# Patient Record
Sex: FEMALE | Race: WHITE | NOT HISPANIC OR LATINO
[De-identification: names, ages, dates, MRNs, and addresses within clinical notes are randomized per-mention and may not be internally consistent; named-entity substitution may affect disease eponyms.]

---

## 2018-08-22 PROBLEM — Z00.00 ENCOUNTER FOR PREVENTIVE HEALTH EXAMINATION: Status: ACTIVE | Noted: 2018-08-22

## 2018-09-04 ENCOUNTER — APPOINTMENT (OUTPATIENT)
Dept: OBGYN | Facility: CLINIC | Age: 48
End: 2018-09-04
Payer: COMMERCIAL

## 2018-09-04 PROCEDURE — 99396 PREV VISIT EST AGE 40-64: CPT

## 2018-09-25 ENCOUNTER — APPOINTMENT (OUTPATIENT)
Dept: OBGYN | Facility: CLINIC | Age: 48
End: 2018-09-25
Payer: COMMERCIAL

## 2018-09-25 PROCEDURE — 76830 TRANSVAGINAL US NON-OB: CPT

## 2023-01-03 ENCOUNTER — APPOINTMENT (OUTPATIENT)
Dept: OBGYN | Facility: CLINIC | Age: 53
End: 2023-01-03
Payer: COMMERCIAL

## 2023-01-03 ENCOUNTER — NON-APPOINTMENT (OUTPATIENT)
Age: 53
End: 2023-01-03

## 2023-01-03 VITALS
DIASTOLIC BLOOD PRESSURE: 80 MMHG | SYSTOLIC BLOOD PRESSURE: 120 MMHG | BODY MASS INDEX: 33.66 KG/M2 | WEIGHT: 190 LBS | HEIGHT: 63 IN

## 2023-01-03 DIAGNOSIS — Z01.419 ENCOUNTER FOR GYNECOLOGICAL EXAMINATION (GENERAL) (ROUTINE) W/OUT ABNORMAL FINDINGS: ICD-10-CM

## 2023-01-03 PROCEDURE — 99396 PREV VISIT EST AGE 40-64: CPT

## 2023-01-03 NOTE — HISTORY OF PRESENT ILLNESS
[FreeTextEntry1] : ---51 Y/O P2  HERE FOR CHECK UP.PT STATES HER LMP WAS 2 YRS AGO AND THEN HAD VAGINAL BLEEDING RECENTLY.\par PMHX; x   *** hearing aids\par SOCIAL;-ETOH   -CIG[employer]        x\par STD;              DISCUSSED CONDOMSx\par FAMILY HX OF BREAST CANCER,OVARIAN,UTERINE CA: pat aunt dec from ov ca age 65 - dx for 3 yrs\par REVIEW OF SYMPTOMS DONE\par ALLERGIES; Patient has answered NKDA\par Medication reconciliation was completed by reviewing, with the patient's\par involvement, the patient's current outpatient medications and those \par ordered for the patient today. x\par \par \par PE; BREASTS -MASSES DC NODES; SELF BREAST EXAM REVIEWED\par ABD SOFT NT ND\par NL GENIT \par VAGINA -DC\par CX -CMT\par UTERUS NL SIZE NT\par ADNEXA NT -MASSES\par \par A;P; CHECK UP , PMB\par -PAP\par -SONO\par -HYSTEROSCOPY\par -[facility] 2/2023 APPT\par -F-U AFTER ABOVE.

## 2023-01-10 ENCOUNTER — APPOINTMENT (OUTPATIENT)
Dept: OBGYN | Facility: CLINIC | Age: 53
End: 2023-01-10
Payer: COMMERCIAL

## 2023-01-10 PROCEDURE — 76830 TRANSVAGINAL US NON-OB: CPT

## 2023-01-17 ENCOUNTER — APPOINTMENT (OUTPATIENT)
Dept: OBGYN | Facility: CLINIC | Age: 53
End: 2023-01-17
Payer: COMMERCIAL

## 2023-01-17 DIAGNOSIS — N95.0 POSTMENOPAUSAL BLEEDING: ICD-10-CM

## 2023-01-17 PROCEDURE — 58558Z: CUSTOM

## 2023-01-17 PROCEDURE — 99213 OFFICE O/P EST LOW 20 MIN: CPT | Mod: 25

## 2023-01-17 NOTE — HISTORY OF PRESENT ILLNESS
[FreeTextEntry1] : ---53 Y/O P2  HERE FOR HYSTEROSCOPY;INFORMED CONSENT OBTAINED RBA DISCUSSED;\par  PT STATES HER LMP WAS 2 YRS AGO AND THEN HAD VAGINAL BLEEDING RECENTLY.\par PMHX; x   *** hearing aids\par SOCIAL;-ETOH   -CIGG        x\par STD;              DISCUSSED CONDOMSx\par FAMILY HX OF BREAST CANCER,OVARIAN,UTERINE CA: pat aunt dec from ov ca age 65 - dx for 3 yrs\par REVIEW OF SYMPTOMS DONE\par ALLERGIES; Patient has answered NKDA\par Medication reconciliation was completed by reviewing, with the patient's\par involvement, the patient's current outpatient medications and those \par ordered for the patient today. x\par \par \par PE; \par ABD SOFT NT ND\par NL GENIT \par VAGINA -DC\par CX -CMT\par UTERUS NL SIZE NT\par ADNEXA NT -MASSES\par \par -TIME OUT DONE.  EBL LESS THAN 5CC\par -IN OFFICE HYSTEROSCOPY DONE WITHOUT INCIDENT; CX DIALTED ,UTERUS SOUNDED TO 7,\par -BOTH OSTIA SEEN; NL APPERING ENDOMETRIAL CAVITY;- MASSES - POLYPS\par -ENDO BX DONE WITHOUT INCIDENT\par -I/O MONITORED THROUGHOUT THE PROCEDURE\par -PT TOLERATED THE PROCEDURE WELL\par -INSTRUCTIONS REVIEWED\par -RTC 2 WEEKS.\par \par